# Patient Record
Sex: MALE | Race: WHITE | NOT HISPANIC OR LATINO | Employment: FULL TIME | URBAN - METROPOLITAN AREA
[De-identification: names, ages, dates, MRNs, and addresses within clinical notes are randomized per-mention and may not be internally consistent; named-entity substitution may affect disease eponyms.]

---

## 2021-06-08 ENCOUNTER — OFFICE VISIT (OUTPATIENT)
Dept: URGENT CARE | Facility: CLINIC | Age: 21
End: 2021-06-08
Payer: COMMERCIAL

## 2021-06-08 VITALS
DIASTOLIC BLOOD PRESSURE: 78 MMHG | WEIGHT: 143 LBS | HEIGHT: 69 IN | BODY MASS INDEX: 21.18 KG/M2 | OXYGEN SATURATION: 97 % | RESPIRATION RATE: 17 BRPM | TEMPERATURE: 97 F | HEART RATE: 87 BPM | SYSTOLIC BLOOD PRESSURE: 126 MMHG

## 2021-06-08 DIAGNOSIS — S05.01XA RIGHT CORNEA ABRASION, INITIAL ENCOUNTER: ICD-10-CM

## 2021-06-08 PROCEDURE — 99204 OFFICE O/P NEW MOD 45 MIN: CPT | Performed by: PHYSICIAN ASSISTANT

## 2021-06-08 RX ORDER — POLYMYXIN B SULFATE AND TRIMETHOPRIM 1; 10000 MG/ML; [USP'U]/ML
1 SOLUTION OPHTHALMIC EVERY 4 HOURS
Qty: 3 ML | Refills: 0 | Status: SHIPPED | OUTPATIENT
Start: 2021-06-08 | End: 2021-06-15

## 2021-06-08 ASSESSMENT — ENCOUNTER SYMPTOMS
HEADACHES: 0
EYE DISCHARGE: 0
NAUSEA: 0
DOUBLE VISION: 0
PHOTOPHOBIA: 1
EYE PAIN: 1
BLURRED VISION: 1
DIZZINESS: 0
EYE REDNESS: 1
VOMITING: 0
CHILLS: 0
BRUISES/BLEEDS EASILY: 0
FEVER: 0

## 2021-06-08 NOTE — PATIENT INSTRUCTIONS
Corneal Abrasion    A corneal abrasion is a scratch or injury to the clear covering over the front of your eye (cornea). Your cornea forms a clear dome that protects your eye and helps to focus your vision. Your cornea is made up of many layers. The surface layer is a single layer of cells (corneal epithelium). It is one of the most sensitive tissues in your body. A corneal abrasion can be very painful.  If a corneal abrasion is not treated, it can become infected and cause an ulcer. This can lead to scarring. A scarred cornea can affect your vision. Sometimes abrasions come back in the same area, even after the original injury has healed (recurrent erosion syndrome).  What are the causes?  This condition may be caused by:  · A poke in the eye.  · A gritty or irritating substance (foreign body) in the eye.  · Excessive eye rubbing.  · Very dry eyes.  · Certain eye infections.  · Contact lenses that fit poorly or are worn for a long period of time. You can also injure your cornea when putting contacts lenses in your eye or taking them out.  · Eye surgery.  Sometimes, the cause is unknown.  What are the signs or symptoms?  Symptoms of this condition include:  · Eye pain. The pain may get worse when your eye is open or when you move your eye.  · A feeling of something stuck in your eye.  · Having trouble keeping your eye open, or not being able to keep it open.  · Tearing and redness.  · Sensitivity to light.  · Blurred vision.  · Headache.  How is this diagnosed?  This condition may be diagnosed based on:  · Your medical history.  · Your symptoms.  · An eye exam. You may work with a health care provider who specializes in diseases and conditions of the eye (ophthalmologist). Before the eye exam, numbing drops may be put into your eye. You may also have dye put in your eye with a dropper or a small paper strip. The dye makes the abrasion easy to see when your ophthalmologist examines your eye with a light. Your  ophthalmologist may look at your eye through an eye scope (slit lamp).  How is this treated?  Treatment may vary depending on the cause of your condition, and it may include:  · Washing out your eye.  · Removing any foreign body.  · Antibiotic drops or ointment to treat an infection.  · Steroid drops or ointment to treat redness, irritation, or inflammation.  · Pain medicine.  · An eye patch to keep your eye closed.  Follow these instructions at home:  Medicines  · Use eye drops or ointments as told by your eye care provider.  · If you were prescribed antibiotic drops or ointment, use them as told by your eye care provider. Do not stop using the antibiotic even if you start to feel better.  · Take over-the-counter and prescription medicines only as told by your eye care provider.  · Do not drive or use heavy machinery while taking prescription pain medicine.  General instructions  · If you have an eye patch, wear it as told by your eye care provider.  ? Do not drive or use machinery while wearing an eye patch. Your ability to  distances will be impaired.  ? Follow instructions from your eye care provider about when to remove the patch.  · Ask your eye care provider whether you can use a cold, wet cloth (compress) on your eye to relieve pain.  · Do not rub or touch your eye. Do not wash out your eye.  · Do not wear contact lenses until your eye care provider says that this is okay.  · Avoid bright light and eye strain.  · Keep all follow-up visits as told by your eye care provider. This is important for preventing infection and vision loss.  Contact a health care provider if:  · You continue to have eye pain and other symptoms for more than 2 days.  · You develop new symptoms, such as redness, tearing, or discharge.  · You have discharge that makes your eyelids stick together in the morning.  · Your eye patch becomes so loose that you can blink your eye.  · Symptoms return after the original abrasion has  healed.  Get help right away if:  · You have severe eye pain that does not get better with medicine.  · You have vision loss.  Summary  · A corneal abrasion is a scratch on the outer layer of the clear covering over the front of your eye (cornea).  · Corneal abrasion can cause eye pain, redness, tearing, and blurred vision.  · This condition is usually treated with medicine to prevent infection and scarring. You also may have to wear an eye patch to cover your eye.  · Let your eye care provider know if your symptoms continue for more than 2 days.  This information is not intended to replace advice given to you by your health care provider. Make sure you discuss any questions you have with your health care provider.  Document Released: 12/15/2001 Document Revised: 11/28/2017 Document Reviewed: 11/28/2017  [employer] Interactive Patient Education © 2020 [employer]

## 2021-06-08 NOTE — PROGRESS NOTES
"Subjective:   Kobi Forrest is a 21 y.o. male who presents for Eye Problem (saw dust in right eye )      HPI  21 y.o. male presents to urgent care with new problem to provider of right eye irritation after getting saw dust into his eye at work yesterday. Reports associated photophobia, blurred vision, and mild pain. Patient has been rubbing his eye since exposure to saw dust. Denies matting, discharge, or inflammation of eyelids.   Patient wears corrective glasses occasionally but not contacts.  Denies other associated aggravating or alleviating factors.     Review of Systems   Constitutional: Negative for chills and fever.   Eyes: Positive for blurred vision, photophobia, pain and redness. Negative for double vision and discharge.   Gastrointestinal: Negative for nausea and vomiting.   Neurological: Negative for dizziness and headaches.   Endo/Heme/Allergies: Negative for environmental allergies. Does not bruise/bleed easily.   All other systems reviewed and are negative.      There is no problem list on file for this patient.    History reviewed. No pertinent surgical history.  Social History     Tobacco Use   • Smoking status: Current Some Day Smoker   • Smokeless tobacco: Never Used   Substance Use Topics   • Alcohol use: Not on file   • Drug use: Not on file      History reviewed. No pertinent family history.   (Allergies, Medications, & Tobacco/Substance Use were reconciled by the Medical Assistant and reviewed by myself. The family history is prepopulated)     Objective:     /78 (BP Location: Left arm, Patient Position: Sitting, BP Cuff Size: Adult)   Pulse 87   Temp 36.1 °C (97 °F) (Temporal)   Resp 17   Ht 1.753 m (5' 9\")   Wt 64.9 kg (143 lb)   SpO2 97%   BMI 21.12 kg/m²     Physical Exam  Vitals reviewed.   Constitutional:       Appearance: Normal appearance. He is well-developed.   HENT:      Head: Normocephalic and atraumatic.   Eyes:      General: Lids are normal. Lids are everted, no " foreign bodies appreciated.      Extraocular Movements: Extraocular movements intact.      Conjunctiva/sclera:      Right eye: Right conjunctiva is injected.      Pupils: Pupils are equal, round, and reactive to light. Pupils are equal.      Right eye: Corneal abrasion and fluorescein uptake present.     Cardiovascular:      Rate and Rhythm: Normal rate.   Pulmonary:      Effort: Pulmonary effort is normal. No respiratory distress.   Musculoskeletal:      Cervical back: Normal range of motion and neck supple.   Skin:     General: Skin is warm and dry.      Findings: No erythema.   Neurological:      Mental Status: He is alert and oriented to person, place, and time.   Psychiatric:         Mood and Affect: Mood normal.         Behavior: Behavior normal.         Thought Content: Thought content normal.         Judgment: Judgment normal.         Assessment/Plan:     1. Right cornea abrasion, initial encounter  polymixin-trimethoprim (POLYTRIM) 68956-3.1 UNIT/ML-% Solution     Right: 20/30  Left: 20/20  Both: 20/25    Recommend polytrim, use as directed. Patient can also use OTC lubricating eye drops. I recommend follow up with [facility], patient was given information to contact and schedule an appointment if his symptoms persistent or worsen. ED precautions discussed. Patient should wear safety goggles at all times while at work.   Advised patient to soak a wash cloth in warm (not scalding) water and place it over right eye. As the wash cloth cools, it should be rewarmed and replaced for a total of 5 to 10 minutes of soaking time. Warm compresses should be applied two to four times a day as long as the patient has symptoms and at a decreased frequency in the maintenance phase.      Differential diagnosis, natural history, supportive care, and indications for immediate follow-up discussed.    Advised the patient to follow-up with the primary care physician for recheck, reevaluation, and consideration of  further management.  Patient verbalized understanding of treatment plan and has no further questions regarding care.     I personally reviewed prior external notes and test results pertinent to today's visit.     Please note that this dictation was created using voice recognition software. I have made a reasonable attempt to correct obvious errors, but I expect that there are errors of grammar and possibly content that I did not discover before finalizing the note.    This note was electronically signed by Crystal Ravi PA-C

## 2021-06-08 NOTE — LETTER
June 8, 2021         Patient: Kobi Forrest   YOB: 2000   Date of Visit: 6/8/2021           To Whom it May Concern:    Kobi Forrest was seen in my clinic on 6/8/2021. Please excuse him from work 6/8 - 6/9.     If you have any questions or concerns, please don't hesitate to call.        Sincerely,           Crystal Ravi P.A.-C.  Electronically Signed

## 2021-06-08 NOTE — LETTER
June 8, 2021         Patient: Kobi Forrest   YOB: 2000   Date of Visit: 6/8/2021           To Whom it May Concern:    Kobi Forrest was seen in my clinic on 6/8/2021. He {Return to school/sport/work:41162}    If you have any questions or concerns, please don't hesitate to call.        Sincerely,           Crystal Ravi P.A.-C.  Electronically Signed

## 2022-07-18 ENCOUNTER — OFFICE VISIT (OUTPATIENT)
Dept: URGENT CARE | Facility: CLINIC | Age: 22
End: 2022-07-18
Payer: COMMERCIAL

## 2022-07-18 VITALS
RESPIRATION RATE: 16 BRPM | HEART RATE: 136 BPM | DIASTOLIC BLOOD PRESSURE: 74 MMHG | BODY MASS INDEX: 20.44 KG/M2 | TEMPERATURE: 98.5 F | WEIGHT: 138 LBS | OXYGEN SATURATION: 98 % | HEIGHT: 69 IN | SYSTOLIC BLOOD PRESSURE: 128 MMHG

## 2022-07-18 DIAGNOSIS — R50.9 FEVER, UNSPECIFIED FEVER CAUSE: ICD-10-CM

## 2022-07-18 LAB
EXTERNAL QUALITY CONTROL: NORMAL
FLUAV+FLUBV AG SPEC QL IA: NEGATIVE
INT CON NEG: NORMAL
INT CON POS: NORMAL
SARS-COV+SARS-COV-2 AG RESP QL IA.RAPID: NEGATIVE

## 2022-07-18 PROCEDURE — 87804 INFLUENZA ASSAY W/OPTIC: CPT | Performed by: PHYSICIAN ASSISTANT

## 2022-07-18 PROCEDURE — 87426 SARSCOV CORONAVIRUS AG IA: CPT | Performed by: PHYSICIAN ASSISTANT

## 2022-07-18 PROCEDURE — 99213 OFFICE O/P EST LOW 20 MIN: CPT | Performed by: PHYSICIAN ASSISTANT

## 2022-07-18 ASSESSMENT — ENCOUNTER SYMPTOMS
NAUSEA: 0
VOMITING: 0
CHILLS: 1
MYALGIAS: 1
DIARRHEA: 0
ABDOMINAL PAIN: 0
SORE THROAT: 0
WHEEZING: 0
FEVER: 1
SHORTNESS OF BREATH: 0
COUGH: 0
SPUTUM PRODUCTION: 0

## 2022-07-18 NOTE — LETTER
July 18, 2022       Patient: Kobi Forrest   YOB: 2000   Date of Visit: 7/18/2022         To Whom It May Concern:    In my medical opinion, I recommend that Kobi Forrest should be excused from work for today, 7/18/2022.  If you have any questions or concerns, please don't hesitate to call [phone]          Sincerely,          Byron Zieglre P.A.-C.  Electronically Signed

## 2022-07-18 NOTE — PROGRESS NOTES
"Subjective:   Kobi Forrest  is a 22 y.o. male who presents for Fever (Fever and chills x a few days, profuse seating episode last night, and vomited this morning. Onset 2 days. The patient did a home covid test and it was negative twice. Tx: Tylenol. )      Fever   Pertinent negatives include no abdominal pain, congestion, coughing, diarrhea, ear pain, nausea ( resolved), rash, sore throat, vomiting ( resolved) or wheezing.   Patient presents urgent care noting last 2 days of subjective fevers and chills.  Complains of diffuse sweating and body aches intermittently with feeling cold.  He denies sore throat or ear pain.  Denies cough.  Did have a day of vomiting at onset with 4-5 episodes throughout the day but denies any nausea or vomiting that persists.  Denies abdominal pain diarrhea or rash.  Denies history of asthma or pneumonia.  Patient did have COVID last year.  Denies history of COVID-vaccine.  Has been using OTC Tylenol and DayQuil for symptoms.  Denies known sick contacts.    Review of Systems   Constitutional: Positive for chills and fever.   HENT: Negative for congestion, ear pain and sore throat.    Respiratory: Negative for cough, sputum production, shortness of breath and wheezing.    Gastrointestinal: Negative for abdominal pain, diarrhea, nausea ( resolved) and vomiting ( resolved).   Musculoskeletal: Positive for myalgias.   Skin: Negative for rash.       No Known Allergies     Objective:   /74 (BP Location: Left arm, Patient Position: Sitting, BP Cuff Size: Adult)   Pulse (!) 136   Temp 36.9 °C (98.5 °F) (Temporal)   Resp 16   Ht 1.753 m (5' 9\")   Wt 62.6 kg (138 lb)   SpO2 98%   BMI 20.38 kg/m²     Physical Exam  Vitals and nursing note reviewed.   Constitutional:       General: He is not in acute distress.     Appearance: He is well-developed. He is not diaphoretic.   HENT:      Head: Normocephalic and atraumatic.      Right Ear: Tympanic membrane, ear canal and external ear " normal.      Left Ear: Tympanic membrane, ear canal and external ear normal.      Nose: Nose normal.      Mouth/Throat:      Pharynx: Uvula midline. Posterior oropharyngeal erythema ( mild PND) present. No oropharyngeal exudate.      Tonsils: No tonsillar abscesses.   Eyes:      General: No scleral icterus.        Right eye: No discharge.         Left eye: No discharge.      Conjunctiva/sclera: Conjunctivae normal.   Pulmonary:      Effort: Pulmonary effort is normal. No respiratory distress.      Breath sounds: No decreased breath sounds, wheezing, rhonchi or rales.   Musculoskeletal:         General: Normal range of motion.      Cervical back: Neck supple.   Lymphadenopathy:      Cervical: Cervical adenopathy ( mild bilat) present.   Skin:     General: Skin is warm and dry.      Coloration: Skin is not pale.   Neurological:      Mental Status: He is alert and oriented to person, place, and time.      Coordination: Coordination normal.       POCT flu - NEG  POCT covid - NEG    Assessment/Plan:   1. Fever, unspecified fever cause  - POCT SARS-COV Antigen SHANON (Symptomatic only)  - POCT Influenza A/B    Supportive care is reviewed with patient/caregiver - recommend to push PO fluids and electrolytes, unclear etiology of symptoms but continue with fever reducing medications, red flag symptoms reviewed with patient, sent with work excuse note  Return to clinic with lack of resolution or progression of symptoms.  ER precautions with any worsening symptoms are reviewed with patient/caregiver and they do express understanding    I have worn an N95 mask, gloves and eye protection for the entire encounter with this patient.     Differential diagnosis, natural history, supportive care, and indications for immediate follow-up discussed.

## 2023-02-09 ENCOUNTER — APPOINTMENT (OUTPATIENT)
Dept: RADIOLOGY | Facility: MEDICAL CENTER | Age: 23
End: 2023-02-09
Attending: EMERGENCY MEDICINE
Payer: COMMERCIAL

## 2023-02-09 ENCOUNTER — HOSPITAL ENCOUNTER (EMERGENCY)
Facility: MEDICAL CENTER | Age: 23
End: 2023-02-09
Attending: EMERGENCY MEDICINE
Payer: COMMERCIAL

## 2023-02-09 VITALS
DIASTOLIC BLOOD PRESSURE: 72 MMHG | TEMPERATURE: 97.4 F | RESPIRATION RATE: 14 BRPM | HEIGHT: 69 IN | HEART RATE: 53 BPM | SYSTOLIC BLOOD PRESSURE: 121 MMHG | WEIGHT: 145 LBS | BODY MASS INDEX: 21.48 KG/M2 | OXYGEN SATURATION: 99 %

## 2023-02-09 DIAGNOSIS — S09.90XA CLOSED HEAD INJURY, INITIAL ENCOUNTER: ICD-10-CM

## 2023-02-09 DIAGNOSIS — S80.00XA CONTUSION OF KNEE, UNSPECIFIED LATERALITY, INITIAL ENCOUNTER: ICD-10-CM

## 2023-02-09 PROCEDURE — 72125 CT NECK SPINE W/O DYE: CPT

## 2023-02-09 PROCEDURE — 304999 HCHG REPAIR-SIMPLE/INTERMED LEVEL 1

## 2023-02-09 PROCEDURE — A9270 NON-COVERED ITEM OR SERVICE: HCPCS | Performed by: EMERGENCY MEDICINE

## 2023-02-09 PROCEDURE — 304217 HCHG IRRIGATION SYSTEM

## 2023-02-09 PROCEDURE — 96372 THER/PROPH/DIAG INJ SC/IM: CPT

## 2023-02-09 PROCEDURE — 99285 EMERGENCY DEPT VISIT HI MDM: CPT

## 2023-02-09 PROCEDURE — 700102 HCHG RX REV CODE 250 W/ 637 OVERRIDE(OP): Performed by: EMERGENCY MEDICINE

## 2023-02-09 PROCEDURE — 700111 HCHG RX REV CODE 636 W/ 250 OVERRIDE (IP): Performed by: EMERGENCY MEDICINE

## 2023-02-09 PROCEDURE — 70450 CT HEAD/BRAIN W/O DYE: CPT

## 2023-02-09 PROCEDURE — 73700 CT LOWER EXTREMITY W/O DYE: CPT | Mod: RT

## 2023-02-09 PROCEDURE — 303747 HCHG EXTRA SUTURE

## 2023-02-09 PROCEDURE — 70486 CT MAXILLOFACIAL W/O DYE: CPT

## 2023-02-09 RX ORDER — CYCLOBENZAPRINE HCL 10 MG
10 TABLET ORAL 3 TIMES DAILY PRN
Qty: 20 TABLET | Refills: 0 | Status: SHIPPED | OUTPATIENT
Start: 2023-02-09

## 2023-02-09 RX ORDER — OXYCODONE HYDROCHLORIDE AND ACETAMINOPHEN 5; 325 MG/1; MG/1
1 TABLET ORAL EVERY 6 HOURS PRN
Qty: 8 TABLET | Refills: 0 | Status: SHIPPED | OUTPATIENT
Start: 2023-02-09 | End: 2023-02-11

## 2023-02-09 RX ORDER — MORPHINE SULFATE 4 MG/ML
4 INJECTION INTRAVENOUS ONCE
Status: COMPLETED | OUTPATIENT
Start: 2023-02-09 | End: 2023-02-09

## 2023-02-09 RX ORDER — OXYCODONE HYDROCHLORIDE AND ACETAMINOPHEN 5; 325 MG/1; MG/1
2 TABLET ORAL ONCE
Status: COMPLETED | OUTPATIENT
Start: 2023-02-09 | End: 2023-02-09

## 2023-02-09 RX ORDER — CEPHALEXIN 500 MG/1
500 CAPSULE ORAL 4 TIMES DAILY
Qty: 28 CAPSULE | Refills: 0 | Status: ACTIVE | OUTPATIENT
Start: 2023-02-09 | End: 2023-02-16

## 2023-02-09 RX ORDER — ONDANSETRON 4 MG/1
4 TABLET, ORALLY DISINTEGRATING ORAL ONCE
Status: COMPLETED | OUTPATIENT
Start: 2023-02-09 | End: 2023-02-09

## 2023-02-09 RX ORDER — IBUPROFEN 800 MG/1
800 TABLET ORAL EVERY 8 HOURS PRN
Qty: 30 TABLET | Refills: 0 | Status: SHIPPED | OUTPATIENT
Start: 2023-02-09

## 2023-02-09 RX ADMIN — MORPHINE SULFATE 4 MG: 4 INJECTION INTRAVENOUS at 15:54

## 2023-02-09 RX ADMIN — ONDANSETRON 4 MG: 4 TABLET, ORALLY DISINTEGRATING ORAL at 15:54

## 2023-02-09 RX ADMIN — LIDOCAINE HYDROCHLORIDE 20 ML: 10 INJECTION, SOLUTION EPIDURAL; INFILTRATION; INTRACAUDAL; PERINEURAL at 15:15

## 2023-02-09 RX ADMIN — ONDANSETRON 4 MG: 4 TABLET, ORALLY DISINTEGRATING ORAL at 17:35

## 2023-02-09 RX ADMIN — OXYCODONE AND ACETAMINOPHEN 2 TABLET: 5; 325 TABLET ORAL at 17:35

## 2023-02-09 NOTE — Clinical Note
Kobi Forrest was seen and treated in our emergency department on 2/9/2023.  He may return to work on 02/14/2023.       If you have any questions or concerns, please don't hesitate to call.      Tutu Marino D.O. Eucrisa Pregnancy And Lactation Text: This medication has not been assigned a Pregnancy Risk Category but animal studies failed to show danger with the topical medication. It is unknown if the medication is excreted in breast milk.
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
## 2023-02-10 NOTE — ED NOTES
Pt given discharge instructions/prescription sent to pharm of choosing/ pt signed narcotic consent/home care instructions explained, pt verbalized understanding of instructions given/pt understands the importance of follow up, pt ambulatory to ER lobby, to be driven home by family.